# Patient Record
Sex: MALE | Race: BLACK OR AFRICAN AMERICAN | NOT HISPANIC OR LATINO | Employment: FULL TIME | URBAN - METROPOLITAN AREA
[De-identification: names, ages, dates, MRNs, and addresses within clinical notes are randomized per-mention and may not be internally consistent; named-entity substitution may affect disease eponyms.]

---

## 2019-08-01 ENCOUNTER — APPOINTMENT (OUTPATIENT)
Dept: RADIOLOGY | Age: 34
End: 2019-08-01
Payer: COMMERCIAL

## 2019-08-01 ENCOUNTER — OFFICE VISIT (OUTPATIENT)
Dept: URGENT CARE | Age: 34
End: 2019-08-01
Payer: COMMERCIAL

## 2019-08-01 VITALS
SYSTOLIC BLOOD PRESSURE: 158 MMHG | RESPIRATION RATE: 16 BRPM | TEMPERATURE: 97.4 F | WEIGHT: 285 LBS | BODY MASS INDEX: 35.43 KG/M2 | DIASTOLIC BLOOD PRESSURE: 90 MMHG | HEIGHT: 75 IN | HEART RATE: 79 BPM | OXYGEN SATURATION: 99 %

## 2019-08-01 DIAGNOSIS — L98.9 SKIN LESION OF HAND: ICD-10-CM

## 2019-08-01 DIAGNOSIS — B07.8 VERRUCA PALMARIS: Primary | ICD-10-CM

## 2019-08-01 PROCEDURE — 73130 X-RAY EXAM OF HAND: CPT

## 2019-08-01 PROCEDURE — 99203 OFFICE O/P NEW LOW 30 MIN: CPT | Performed by: FAMILY MEDICINE

## 2019-08-01 NOTE — PROGRESS NOTES
[facility]        NAME: Mikael Vu is a 37 y o  male  : 1985    MRN: [id number]  DATE: 2019  TIME: 10:10 AM    Assessment and Plan   Skin lesion of hand [L98 9]  1  Skin lesion of hand  XR hand 3+ vw right   2  Verruca palmaris  Ambulatory referral to Dermatology    salicylic acid-lactic acid 17 %     Lesion on right palm times months:  Increasing discomfort  Patient works with hazmat material including glass, preliminary x-ray of the area shows no foreign body  On exam, there is a small callus heart nodule in the middle of palm, likely a verruca Palmaris  Patient Instructions     Apply Salicylic acid  as directed  Follow up with PCP/Dermatology in 1 week  Proceed to  ER if symptoms worsen  Chief Complaint     Chief Complaint   Patient presents with    Mass     Pt c/o bump in palm of right hand for several months  History of Present Illness       Patient is a 75-year-old male who presents with a lesion on his right palm x6 few months  According to patient, he noticed the bump the palm of his hand however it has gotten a bit painful now when he tries to close his hand  He works with has mat material including glass he is a bit concerned that there might be class in there, which has scarred over the months  Denies any radiation of pain, erythema, edema, numbness or tingling  Review of Systems   Review of Systems   Constitutional: Negative for fever  Musculoskeletal:        Bump on right palm   Skin: Negative for rash and wound  Neurological: Negative for numbness           Current Medications       Current Outpatient Medications:     salicylic acid-lactic acid 17 %, Apply topically daily, Disp: 1 Bottle, Rfl: 0    Current Allergies     Allergies as of 2019    (No Known Allergies)            The following portions of the patient's history were reviewed and updated as appropriate: allergies, current medications, past family history, past medical history, past social history, past surgical history and problem list      History reviewed  No pertinent past medical history  History reviewed  No pertinent surgical history  History reviewed  No pertinent family history  Medications have been verified  Objective   /90   Pulse 79   Temp (!) 97 4 °F (36 3 °C) (Tympanic)   Resp 16   Ht 6' 3" (1 905 m)   Wt 129 kg (285 lb)   SpO2 99%   BMI 35 62 kg/m²        Physical Exam     Physical Exam   Constitutional: He is oriented to person, place, and time  He appears well-developed and well-nourished  No distress  HENT:   Head: Normocephalic and atraumatic  Right Ear: External ear normal    Left Ear: External ear normal    Nose: Nose normal    Mouth/Throat: Oropharynx is clear and moist    Eyes: Pupils are equal, round, and reactive to light  Conjunctivae and EOM are normal    Neck: Normal range of motion  Neck supple  Cardiovascular: Normal rate  Pulmonary/Chest: Effort normal and breath sounds normal  No respiratory distress  He has no wheezes  He has no rales  He exhibits no tenderness  Musculoskeletal: Normal range of motion  He exhibits no edema, tenderness or deformity  Small callus nodule on palm of right hand, mild tenderness to palpation  No erythema or edema noted  Lymphadenopathy:     He has no cervical adenopathy  Neurological: He is alert and oriented to person, place, and time  Skin: Skin is warm and dry  No rash noted  No erythema  No pallor  Psychiatric: He has a normal mood and affect  Nursing note and vitals reviewed

## 2019-11-11 ENCOUNTER — OFFICE VISIT (OUTPATIENT)
Dept: FAMILY MEDICINE CLINIC | Facility: CLINIC | Age: 34
End: 2019-11-11
Payer: COMMERCIAL

## 2019-11-11 VITALS
SYSTOLIC BLOOD PRESSURE: 124 MMHG | DIASTOLIC BLOOD PRESSURE: 64 MMHG | BODY MASS INDEX: 38.78 KG/M2 | WEIGHT: 292.6 LBS | TEMPERATURE: 99.1 F | HEART RATE: 84 BPM | OXYGEN SATURATION: 97 % | HEIGHT: 73 IN

## 2019-11-11 DIAGNOSIS — Z76.89 ENCOUNTER TO ESTABLISH CARE: Primary | ICD-10-CM

## 2019-11-11 DIAGNOSIS — Z11.3 SCREENING EXAMINATION FOR STD (SEXUALLY TRANSMITTED DISEASE): ICD-10-CM

## 2019-11-11 DIAGNOSIS — E66.9 OBESITY, CLASS II, BMI 35-39.9: ICD-10-CM

## 2019-11-11 PROBLEM — Z72.0 TOBACCO ABUSE: Status: ACTIVE | Noted: 2019-11-11

## 2019-11-11 PROCEDURE — 99203 OFFICE O/P NEW LOW 30 MIN: CPT | Performed by: INTERNAL MEDICINE

## 2019-11-11 PROCEDURE — 4004F PT TOBACCO SCREEN RCVD TLK: CPT | Performed by: INTERNAL MEDICINE

## 2019-11-11 NOTE — PROGRESS NOTES
Assessment/Plan:     Diagnoses and all orders for this visit:    Encounter to establish care    Obesity, Class II, BMI 35-39 9  -     Hemoglobin A1C; Future  -     Lipid panel  -     Comprehensive metabolic panel  -     CBC and differential  -     TSH, 3rd generation with Free T4 reflex    Reports going to the gym symptoms but encouraged to go more often  Screening examination for STD (sexually transmitted disease)  -     HIV 1/2 AG-AB combo; Future  -     Chlamydia/GC amplified DNA by PCR; Future  -     RPR; Future      Asymptomatic at this time but would like testing  Discussed influenza and tetanus booster but does not want anything  BMI Counseling: Body mass index is 38 34 kg/m²  The BMI is above normal  Nutrition recommendations include decreasing portion sizes and encouraging healthy choices of fruits and vegetables  Exercise recommendations include exercising 3-5 times per week  No pharmacotherapy was ordered  Tobacco Cessation Counseling: Tobacco cessation counseling was provided  The patient is sincerely urged to quit consumption of tobacco  He is not ready to quit tobacco  Medication options discussed  Subjective:      Patient ID: Aman Mitchell is a 37 y o  male  Tavia Allen is here today to establish care  Chat was reviewed and updated  He reports feeling well overall  He would like STD testing and also blood work for follow up  Reports no significant history or current symptoms  The following portions of the patient's history were reviewed and updated as appropriate: allergies, current medications, past family history, past medical history, past social history, past surgical history and problem list     Review of Systems   Constitutional: Negative for chills, fever and unexpected weight change  HENT: Negative for sore throat  Eyes: Negative for visual disturbance  Respiratory: Negative for cough, chest tightness and shortness of breath      Cardiovascular: Negative for chest pain, palpitations and leg swelling  Gastrointestinal: Negative for abdominal pain, diarrhea, nausea and vomiting  Genitourinary: Negative for difficulty urinating  Musculoskeletal: Negative for arthralgias and myalgias  Neurological: Negative for dizziness, numbness and headaches  Psychiatric/Behavioral: Negative for dysphoric mood and sleep disturbance  The patient is not nervous/anxious  Objective:      /64   Pulse 84   Temp 99 1 °F (37 3 °C) (Tympanic)   Ht 6' 1 25" (1 861 m)   Wt 133 kg (292 lb 9 6 oz)   SpO2 97%   BMI 38 34 kg/m²          Physical Exam   Constitutional: He is oriented to person, place, and time  He appears well-developed and well-nourished  No distress  HENT:   Head: Normocephalic and atraumatic  Eyes: Conjunctivae and EOM are normal  Right eye exhibits no discharge  Left eye exhibits no discharge  No scleral icterus  Neck: Normal range of motion  Cardiovascular: Normal rate, regular rhythm and normal heart sounds  No murmur heard  Pulmonary/Chest: Effort normal and breath sounds normal  No respiratory distress  He has no wheezes  Abdominal: Soft  Bowel sounds are normal  There is no tenderness  Musculoskeletal: Normal range of motion  He exhibits no edema  Lymphadenopathy:     He has no cervical adenopathy  Neurological: He is alert and oriented to person, place, and time  Skin: Skin is warm and dry  He is not diaphoretic  No erythema  Psychiatric: He has a normal mood and affect  His speech is normal and behavior is normal  Judgment and thought content normal    Vitals reviewed